# Patient Record
Sex: FEMALE | Race: WHITE
[De-identification: names, ages, dates, MRNs, and addresses within clinical notes are randomized per-mention and may not be internally consistent; named-entity substitution may affect disease eponyms.]

---

## 2019-11-18 ENCOUNTER — HOSPITAL ENCOUNTER (EMERGENCY)
Dept: HOSPITAL 59 - ER | Age: 19
Discharge: HOME | End: 2019-11-18
Payer: MEDICAID

## 2019-11-18 DIAGNOSIS — J06.9: Primary | ICD-10-CM

## 2019-11-18 PROCEDURE — 99282 EMERGENCY DEPT VISIT SF MDM: CPT

## 2019-11-18 NOTE — EMERGENCY DEPARTMENT RECORD
History of Present Illness





- General


Chief Complaint: Cough


Stated Complaint: COLD FOR 2 WEEKS


Time Seen by Provider: 19 22:38


Source: Patient


Mode of Arrival: Ambulatory


Limitations: No limitations





- History of Present Illness


Initial Comments: 





20 yo  at approximately 5 weeks gestation for evaluation of nasal congestion

for approximately 10 weeks.  Patient denies fevers, chills, or productive cough 

symptoms.  Patient was seen at [facility] 3 days ago, diagnosed with URI and prescribed

Flonase as directed.  Patient reports left-sided ear pain symptoms as well, but 

reports she is unable to breathe at night through the nose.  


MD Complaint: Nasal congestion


Onset/Timing: 10


-: Days(s)


Severity: Mild


Consistency: Constant


Improves With: Nothing


Worsens With: Nothing


Associated Symptoms: Denies other symptoms


Treatments Prior to Arrival: Other





- Related Data


                                Home Medications











 Medication  Instructions  Recorded  Confirmed  Last Taken


 


No Home Med [NO HOME MEDS]  19 Unknown











                                    Allergies











Allergy/AdvReac Type Severity Reaction Status Date / Time


 


No Known Drug Allergies Allergy   Verified 19 22:39














Travel Screening





- Travel/Exposure Within Last 30 Days


Have you traveled within the last 30 days?: No





- Travel Symptoms


Symptom Screening: None





Review of Systems


Constitutional: Denies: Chills, Fever, Malaise, Night sweats


Eyes: Denies: Eye discharge, Eye pain


ENT: Reports: Congestion, Ear pain.  Denies: Dental pain, Epistaxis


Respiratory: Reports: Cough.  Denies: Dyspnea


Cardiovascular: Denies: Chest pain, Dyspnea on exertion


Endocrine: Denies: Fatigue, Heat or cold intolerance


Gastrointestinal: Denies: Abdominal pain, Nausea, Vomiting


Genitourinary: Denies: Incontinence, Retention


Musculoskeletal: Denies: Arthralgia, Back pain


Skin: Denies: Bruising, Change in color


Neurological: Denies: Abnormal gait, Confusion, Headache, Seizure


Psychiatric: Denies: Anxiety


Hematological/Lymphatic: Denies: Anemia, Blood Clots





Past Medical History





- SOCIAL HISTORY


Smoking Status: Never smoker


Alcohol Use: None


Drug Use: None





- RESPIRATORY


Hx Respiratory Disorders: No





- CARDIOVASCULAR


Hx Cardio Disorders: No





- NEURO


Hx Neuro Disorders: No





- GI


Hx GI Disorders: No





- 


Hx Genitourinary Disorders: No





- ENDOCRINE


Hx Endocrine Disorders: No





- MUSCULOSKELETAL


Hx Musculoskeletal Disorders: No





- PSYCH


Hx Psych Problems: No





- HEMATOLOGY/ONCOLOGY


Hx Hematology/Oncology Disorders: No





Family Medical History


Any Significant Family History?: No


Family Hx Comment (NOT TO BE USED IN PLACE OF ITEMS BELOW): denies





Physical Exam





- General


General Appearance: Alert, Oriented x3, Cooperative, Mild distress


Limitations: No limitations





- Head


Head exam: Atraumatic, Normocephalic, Normal inspection


Head exam detail: negative: Abrasion, Contusion, Fink's sign, General 

tenderness, Hematoma, Laceration





- Eye


Eye exam: Normal appearance.  negative: Conjunctival injection, Periorbital 

swelling, Periorbital tenderness, Scleral icterus





- ENT


ENT exam: Normal orophraynx, TM's normal bilaterally


Ear exam: negative: Auricular hematoma, Auricular trauma


Nasal Exam: Other (Mild swelling of the turbinates bilaterally).  negative: 

Active bleeding, Discharge, Dried blood, Foreign body


Mouth exam: negative: Drooling, Laceration, Muffled voice, Tongue elevation


Throat exam: negative: Tonsillar erythema, Tonsillomegaly, R peritonsillar mass,

L peritonsillar mass





- Neck


Neck exam: Normal inspection.  negative: Meningismus, Tenderness





- Respiratory


Respiratory exam: Normal lung sounds bilaterally.  negative: Rales, Respiratory 

distress, Rhonchi





- Cardiovascular


Cardiovascular Exam: Regular rate, Normal rhythm, Normal heart sounds





- GI/Abdominal


GI/Abdominal exam: Soft.  negative: Rebound, Rigid, Tenderness





- Rectal


Rectal exam: Deferred





- 


 exam: Deferred





- Extremities


Extremities exam: Normal inspection.  negative: Pedal edema, Tenderness





- Back


Back exam: Denies: CVA tenderness (R), CVA tenderness (L)





- Neurological


Neurological exam: Alert, Normal gait, Oriented X3





- Psychiatric


Psychiatric exam: Normal affect, Normal mood





- Skin


Skin exam: Normal color.  negative: Abrasion


Type of lesion: negative: abrasion





Course





                                   Vital Signs











  19





  22:32


 


Temperature 97.6 F


 


Pulse Rate [ 81





Left] 


 


Respiratory 16





Rate 


 


Blood Pressure 109/73





[Left] 


 


Pulse Ox 100














- Reevaluation(s)


Reevaluation #1: 





19 22:47


Patient was seen and examined


No evidence for bacterial source of infection on examination.


Patient's main complaint is difficulty breathing through the nose at night.


Recommended continued use of Flonase as directed.


Patient appears stable for discharge at this time.





Disposition


Disposition: Discharge


Clinical Impression: 


 Upper respiratory infection, viral





Disposition: Home, Self-Care


Condition: (2) Stable


Instructions:  Allergic Rhinitis (ED)


Additional Instructions: 


Return to ED if your symptoms worsen or if you have any concerns.


Continue Flonase as directed.


Follow-up with your family doctor in 3-5 days as directed.





Forms:  Patient Portal Access


Time of Disposition: 22:43





Quality





- Quality Measures


Quality Measures: N/A





- Blood Pressure Screening


Does Patient Have Any of the Following: No


Blood Pressure Classification: Normal BP Reading


Systolic Measurement: 109


Diastolic Measurement: 73


Screening for High Blood Pressure: < Normal BP, F/U Not Required > []

## 2019-12-16 ENCOUNTER — HOSPITAL ENCOUNTER (EMERGENCY)
Dept: HOSPITAL 59 - ER | Age: 19
Discharge: HOME | End: 2019-12-16
Payer: MEDICAID

## 2019-12-16 DIAGNOSIS — O21.0: Primary | ICD-10-CM

## 2019-12-16 DIAGNOSIS — Z3A.10: ICD-10-CM

## 2019-12-16 PROCEDURE — 99283 EMERGENCY DEPT VISIT LOW MDM: CPT

## 2019-12-16 NOTE — EMERGENCY DEPARTMENT RECORD
History of Present Illness





- General


Chief complaint: Vomiting


Stated complaint: NAUSEA,PREGNENT


Time Seen by Provider: 19 19:58


Source: Patient


Mode of Arrival: Ambulatory


Limitations: No limitations





- History of Present Illness


Initial comments: 





18 yo female presents to ED for evaluation of nausea, vomiting x 1 this evening.

 Patient reports that she is approximately 10 weeks gestation, denies fevers, 

chills, or abdominal pain pain symptoms.  Patient denies spotting or vaginal 

discharge.  Patient denies health problems at her baseline.


MD complaint: Nausea, Vomiting


Onset/Timin


-: Days(s)


Associated Abdominal Pain: No


Radiation: None


Improves with: None


Worsens with: None


Context: Other


Associated Symptoms: Denies other symptoms





- Related Data


                                  Previous Rx's











 Medication  Instructions  Recorded


 


Ondansetron [Zofran Odt] 4 mg PO Q8H PRN #20 tab.rapdis 19











                                    Allergies











Allergy/AdvReac Type Severity Reaction Status Date / Time


 


No Known Drug Allergies Allergy   Verified 19 22:39














Travel Screening





- Travel/Exposure Within Last 30 Days


Have you traveled within the last 30 days?: No





- Travel Symptoms


Symptom Screening: Vomiting





Review of Systems


Constitutional: Denies: Chills, Fever, Malaise, Night sweats


Eyes: Denies: Eye discharge, Eye pain


ENT: Denies: Congestion, Ear pain, Epistaxis


Respiratory: Denies: Cough, Dyspnea


Cardiovascular: Denies: Chest pain, Dyspnea on exertion, Palpitations


Endocrine: Denies: Fatigue, Heat or cold intolerance


Gastrointestinal: Reports: Nausea, Vomiting.  Denies: Abdominal pain


Genitourinary: Denies: Incontinence, Retention


Musculoskeletal: Denies: Arthralgia, Back pain


Skin: Denies: Bruising, Change in color


Neurological: Denies: Abnormal gait, Confusion, Headache, Seizure


Psychiatric: Denies: Anxiety


Hematological/Lymphatic: Denies: Anemia, Blood Clots





Past Medical History





- SOCIAL HISTORY


Smoking Status: Never smoker


Alcohol Use: None


Drug Use: None





- RESPIRATORY


Hx Respiratory Disorders: No





- CARDIOVASCULAR


Hx Cardio Disorders: No





- NEURO


Hx Neuro Disorders: No





- GI


Hx GI Disorders: No





- 


Hx Genitourinary Disorders: No





- ENDOCRINE


Hx Endocrine Disorders: No





- MUSCULOSKELETAL


Hx Musculoskeletal Disorders: No





- PSYCH


Hx Psych Problems: No





- HEMATOLOGY/ONCOLOGY


Hx Hematology/Oncology Disorders: No





Family Medical History


Any Significant Family History?: No


Family Hx Comment (NOT TO BE USED IN PLACE OF ITEMS BELOW): denies





Physical Exam





- General


General Appearance: Alert, Oriented x3, Cooperative, No acute distress


Limitations: No limitations





- Head


Head exam: Atraumatic, Normocephalic, Normal inspection


Head exam detail: negative: Abrasion, Contusion, Fink's sign, General 

tenderness, Hematoma, Laceration





- Eye


Eye exam: Normal appearance.  negative: Conjunctival injection, Periorbital 

swelling, Periorbital tenderness, Scleral icterus





- ENT


Ear exam: negative: Auricular hematoma, Auricular trauma


Nasal Exam: negative: Active bleeding, Discharge, Dried blood, Foreign body


Mouth exam: negative: Drooling, Laceration, Muffled voice, Tongue elevation





- Neck


Neck exam: Normal inspection.  negative: Meningismus, Tenderness





- Respiratory


Respiratory exam: Normal lung sounds bilaterally.  negative: Rales, Respiratory 

distress, Rhonchi, Stridor





- Cardiovascular


Cardiovascular Exam: Regular rate, Normal rhythm, Normal heart sounds





- GI/Abdominal


GI/Abdominal exam: Soft.  negative: Rebound, Rigid, Tenderness





- Rectal


Rectal exam: Deferred





- 


 exam: Deferred





- Extremities


Extremities exam: Normal inspection.  negative: Pedal edema, Tenderness





- Back


Back exam: Denies: CVA tenderness (R), CVA tenderness (L)





- Neurological


Neurological exam: Alert, Normal gait, Oriented X3





- Psychiatric


Psychiatric exam: Normal affect, Normal mood





- Skin


Skin exam: Normal color.  negative: Abrasion


Type of lesion: negative: abrasion





Course





                                   Vital Signs











  19





  20:00


 


Temperature 97.6 F


 


Pulse Rate [ 83





Pulse Ox Probe] 


 


Respiratory 18





Rate 


 


Blood Pressure 109/76





[Left Arm] 


 


Pulse Ox 98














- Reevaluation(s)


Reevaluation #1: 





19 20:14


Patient's symptoms appear c/w nausea/vomiting in pregnancy


Patient has benign abdominal examination


No clinical concern for ectopic/spontaneous miscarriage on examination.


Will treat symptomatically with Zofran as needed.





Disposition


Disposition: Discharge


Clinical Impression: 


 Nausea/vomiting in pregnancy





Disposition: Home, Self-Care


Condition: (2) Stable


Instructions:  Acute Nausea and Vomiting (ED)


Additional Instructions: 


Return to ED if your symptoms worsen or if you have any concerns.


Zofran as directed.


Follow-up with your family doctor in 3-5 days as directed.


Prescriptions: 


Ondansetron [Zofran Odt] 4 mg PO Q8H PRN #20 tab.rapdis


 PRN Reason: Nausea/Vomiting


Forms:  Patient Portal Access


Time of Disposition: 20:07





Quality





- Quality Measures


Quality Measures: N/A





- Blood Pressure Screening


Does Patient Have Any of the Following: No


Blood Pressure Classification: Normal BP Reading


Systolic Measurement: 109


Diastolic Measurement: 76


Screening for High Blood Pressure: < Normal BP, F/U Not Required > []

## 2020-01-01 ENCOUNTER — HOSPITAL ENCOUNTER (EMERGENCY)
Dept: HOSPITAL 59 - ER | Age: 20
Discharge: HOME | End: 2020-01-01
Payer: MEDICAID

## 2020-01-01 DIAGNOSIS — R51: Primary | ICD-10-CM

## 2020-01-01 DIAGNOSIS — R11.2: ICD-10-CM

## 2020-01-01 DIAGNOSIS — M54.2: ICD-10-CM

## 2020-01-01 DIAGNOSIS — H53.149: ICD-10-CM

## 2020-01-01 PROCEDURE — 99284 EMERGENCY DEPT VISIT MOD MDM: CPT

## 2020-01-01 PROCEDURE — 96375 TX/PRO/DX INJ NEW DRUG ADDON: CPT

## 2020-01-01 PROCEDURE — 96374 THER/PROPH/DIAG INJ IV PUSH: CPT

## 2020-01-01 NOTE — EMERGENCY DEPARTMENT RECORD
History of Present Illness





- General


Stated Complaint: MIGRAINE


Time Seen by Provider: 20 19:56


Source: Patient


Mode of Arrival: Ambulatory


Limitations: No limitations





- History of Present Illness


Initial Comments: 





20 yo female presents to ED for evaluation of a "migraine headache" that started

as a typical headache yesterday, worsened today.  Patient denies fevers, chills,

or neck stiffness symptoms, patient does report taking Ibuprofen and zofran for 

her symptoms this morning when her headache symptoms worsened.  Patient denies 

health problems at her baseline.


MD Complaint: Headache


Onset/Timin


-: Days(s)


Onset Description: Gradual


Location: Diffuse


Severity: Moderate


Quality: Throbbing


Consistency: Constant


Improves With: Nothing


Worsens With: None


Treatments Prior to Arrival: Antiemetic, Ibuprofen





- Related Data


                                  Previous Rx's











 Medication  Instructions  Recorded


 


Ondansetron [Zofran Odt] 4 mg PO Q8H PRN #20 tab.rapdis 19











                                    Allergies











Allergy/AdvReac Type Severity Reaction Status Date / Time


 


No Known Drug Allergies Allergy   Verified 20 20:11














Review of Systems


Constitutional: Denies: Chills, Fever, Malaise, Night sweats


Eyes: Denies: Eye discharge, Eye pain


ENT: Denies: Congestion, Ear pain, Epistaxis


Respiratory: Denies: Cough, Dyspnea


Cardiovascular: Denies: Chest pain, Dyspnea on exertion


Endocrine: Denies: Fatigue, Heat or cold intolerance


Gastrointestinal: Denies: Abdominal pain, Nausea, Vomiting


Genitourinary: Denies: Incontinence, Retention


Musculoskeletal: Denies: Arthralgia, Back pain


Skin: Denies: Bruising, Change in color


Neurological: Reports: Headache.  Denies: Abnormal gait, Confusion, Seizure


Psychiatric: Denies: Anxiety


Hematological/Lymphatic: Denies: Anemia, Blood Clots





Past Medical History





- SOCIAL HISTORY


Smoking Status: Never smoker


Drug Use: None





- RESPIRATORY


Hx Respiratory Disorders: No





- CARDIOVASCULAR


Hx Cardio Disorders: No





- NEURO


Hx Neuro Disorders: No





- GI


Hx GI Disorders: No





- 


Hx Genitourinary Disorders: No





- ENDOCRINE


Hx Endocrine Disorders: No





- MUSCULOSKELETAL


Hx Musculoskeletal Disorders: No





- PSYCH


Hx Psych Problems: No





- HEMATOLOGY/ONCOLOGY


Hx Hematology/Oncology Disorders: No





Family Medical History


Family Hx Comment (NOT TO BE USED IN PLACE OF ITEMS BELOW): denies





Physical Exam





- General


General Appearance: Alert, Oriented x3, Cooperative, No acute distress, Other 

(Smiling, well appearing on examination)


Limitations: No limitations





- Head


Head exam: Atraumatic, Normocephalic, Normal inspection


Head exam detail: negative: Abrasion, Contusion, Fink's sign, General 

tenderness, Hematoma, Laceration





- Eye


Eye exam: Normal appearance.  negative: Conjunctival injection, Periorbital 

swelling, Periorbital tenderness, Scleral icterus





- ENT


Ear exam: negative: Auricular hematoma, Auricular trauma


Nasal Exam: negative: Active bleeding, Discharge, Dried blood, Foreign body


Mouth exam: negative: Drooling, Laceration, Muffled voice, Tongue elevation





- Neck


Neck exam: Normal inspection.  negative: Meningismus, Tenderness





- Respiratory


Respiratory exam: Normal lung sounds bilaterally.  negative: Rales, Respiratory 

distress, Rhonchi, Stridor





- Cardiovascular


Cardiovascular Exam: Regular rate, Normal rhythm, Normal heart sounds





- GI/Abdominal


GI/Abdominal exam: Soft, Other (Gravid uterus).  negative: Rebound, Rigid, 

Tenderness





- Rectal


Rectal exam: Deferred





- 


 exam: Deferred





- Extremities


Extremities exam: Normal inspection.  negative: Pedal edema, Tenderness





- Back


Back exam: Denies: CVA tenderness (R), CVA tenderness (L)





- Neurological


Neurological exam: Alert, Normal gait, Oriented X3





- Psychiatric


Psychiatric exam: Normal affect, Normal mood





- Skin


Skin exam: Normal color.  negative: Abrasion


Type of lesion: negative: abrasion





Course





                                   Vital Signs











  20





  20:00


 


Temperature 98.3 F


 


Pulse Rate [ 82





Pulse Ox Probe] 


 


Respiratory 20





Rate 


 


Blood Pressure 106/61





[Left Arm] 


 


Pulse Ox 100














- Reevaluation(s)


Reevaluation #1: 





20 21:04


Patient was reassessed, reports that she is feeling much better.


Patient is smiling, well appearing, and stable for discharge at this time.





Disposition


Disposition: Discharge


Clinical Impression: 


Acute headache


Qualifiers:


 Headache type: unspecified Intractability: not intractable Qualified Code(s): 

R51 - Headache





Disposition: Home, Self-Care


Condition: (2) Stable


Instructions:  Acute Headache (ED)


Additional Instructions: 


Return to ED if your symptoms worsen or if you have any concerns.


(medication) as directed.


Follow-up with your family doctor in 3-5 days as directed.


Time of Disposition: 21:05





Quality





- Quality Measures


Quality Measures: N/A





- Blood Pressure Screening


Does Patient Have Any of the Following: No


Blood Pressure Classification: Normal BP Reading


Systolic Measurement: 106


Diastolic Measurement: 61


Screening for High Blood Pressure: < Normal BP, F/U Not Required > []

## 2020-01-07 ENCOUNTER — HOSPITAL ENCOUNTER (EMERGENCY)
Dept: HOSPITAL 59 - ER | Age: 20
Discharge: HOME | End: 2020-01-07
Payer: MEDICAID

## 2020-01-07 DIAGNOSIS — R05: ICD-10-CM

## 2020-01-07 DIAGNOSIS — J06.9: Primary | ICD-10-CM

## 2020-01-07 PROCEDURE — 87880 STREP A ASSAY W/OPTIC: CPT

## 2020-01-07 PROCEDURE — 99282 EMERGENCY DEPT VISIT SF MDM: CPT

## 2020-01-07 NOTE — EMERGENCY DEPARTMENT RECORD
History of Present Illness





- General


Chief Complaint: Cough


Stated Complaint: COUGH SORE THROAT


Time Seen by Provider: 20 19:07


Source: Patient


Mode of Arrival: Ambulatory


Limitations: No limitations





- History of Present Illness


Initial Comments: 





18 yo female presents to ED for evaluation of sore throat and non-productive 

cough symptoms for the past 1 week.  Patient denies fever symptoms, denies 

abdominal pain, nausea, or vomiting symptoms.  Patient denies health problems at

her baseline.


MD Complaint: Cough, Nasal congestion


Onset/Timin


-: Week(s)


Severity: Moderate


Severity scale (1-10): 7


Consistency: Constant


Worsens With: Nothing


Associated Symptoms: Denies other symptoms





- Related Data


                                  Previous Rx's











 Medication  Instructions  Recorded


 


Ondansetron [Zofran Odt] 4 mg PO Q8H PRN #20 tab.rapdis 19











                                    Allergies











Allergy/AdvReac Type Severity Reaction Status Date / Time


 


No Known Drug Allergies Allergy   Verified 20 20:11














Travel Screening





- Travel/Exposure Within Last 30 Days


Have you traveled within the last 30 days?: No





- Travel/Exposure Within Last Year


Have you traveled outside the [place] in the last year?: No





- Additonal Travel Details


Have you been exposed to anyone with a communicable illness?: No





Review of Systems


Constitutional: Denies: Chills, Fever, Malaise, Night sweats


Eyes: Denies: Eye discharge, Eye pain


ENT: Reports: Congestion, Throat pain.  Denies: Ear pain, Epistaxis


Respiratory: Reports: Cough.  Denies: Dyspnea


Cardiovascular: Denies: Chest pain, Dyspnea on exertion


Endocrine: Denies: Fatigue, Heat or cold intolerance


Gastrointestinal: Denies: Abdominal pain, Nausea, Vomiting


Genitourinary: Denies: Incontinence, Retention


Musculoskeletal: Denies: Arthralgia, Back pain


Skin: Denies: Bruising, Change in color


Neurological: Denies: Abnormal gait, Confusion, Headache, Seizure


Psychiatric: Denies: Anxiety


Hematological/Lymphatic: Denies: Anemia, Blood Clots





Past Medical History





- SOCIAL HISTORY


Smoking Status: Never smoker





- RESPIRATORY


Hx Respiratory Disorders: No





- CARDIOVASCULAR


Hx Cardio Disorders: No





- NEURO


Hx Neuro Disorders: No


Hx Headaches: Yes





- GI


Hx GI Disorders: No





- 


Hx Genitourinary Disorders: No





- ENDOCRINE


Hx Endocrine Disorders: No





- MUSCULOSKELETAL


Hx Musculoskeletal Disorders: No





- PSYCH


Hx Psych Problems: No





- HEMATOLOGY/ONCOLOGY


Hx Hematology/Oncology Disorders: No





Family Medical History


Any Significant Family History?: No


Family Hx Comment (NOT TO BE USED IN PLACE OF ITEMS BELOW): denies





Physical Exam





- General


General Appearance: Alert, Oriented x3, Cooperative, No acute distress, Other 

(On mobile phone, well appearing on examination.)


Limitations: No limitations





- Head


Head exam: Atraumatic, Normocephalic, Normal inspection


Head exam detail: negative: Abrasion, Contusion, Fink's sign, General 

tenderness, Hematoma, Laceration





- Eye


Eye exam: Normal appearance.  negative: Conjunctival injection, Periorbital 

swelling, Periorbital tenderness, Scleral icterus





- ENT


Ear exam: negative: Auricular hematoma, Auricular trauma


Nasal Exam: negative: Active bleeding, Discharge, Dried blood, Foreign body


Mouth exam: negative: Drooling, Laceration, Muffled voice, Tongue elevation


Throat exam: negative: Tonsillar erythema, Tonsillomegaly, Tonsillar exudate, R 

peritonsillar mass, L peritonsillar mass





- Neck


Neck exam: Normal inspection.  negative: Meningismus, Tenderness





- Respiratory


Respiratory exam: Normal lung sounds bilaterally.  negative: Rales, Respiratory 

distress, Rhonchi, Stridor





- Cardiovascular


Cardiovascular Exam: Regular rate, Normal rhythm, Normal heart sounds





- GI/Abdominal


GI/Abdominal exam: Soft.  negative: Rebound, Rigid, Tenderness





- Rectal


Rectal exam: Deferred





- 


 exam: Deferred





- Extremities


Extremities exam: Normal inspection.  negative: Pedal edema, Tenderness





- Back


Back exam: Denies: CVA tenderness (R), CVA tenderness (L)





- Neurological


Neurological exam: Alert, Normal gait, Oriented X3





- Psychiatric


Psychiatric exam: Normal affect, Normal mood





- Skin


Skin exam: Normal color.  negative: Abrasion


Type of lesion: negative: abrasion





Course





                                   Vital Signs











  20





  19:15


 


Temperature 98.1 F


 


Pulse Rate 95 H


 


Respiratory 20





Rate 


 


Blood Pressure 103/63


 


Pulse Ox 100














- Reevaluation(s)


Reevaluation #1: 





20 19:45


Rapid strep: Negative





Patient's symptoms appear c/w viral URI


Specific symptomatic treatment with Tylenol was discussed.


Patient appears stable for discharge at this time.





Disposition


Disposition: Discharge


Clinical Impression: 


 Upper respiratory infection, viral





Disposition: Home, Self-Care


Condition: (2) Stable


Instructions:  Upper Respiratory Infection (ED)


Additional Instructions: 


Return to ED if your symptoms worsen or if you have any concerns.


Follow-up with your family doctor in 3-5 days as directed.


Forms:  Patient Portal Access


Time of Disposition: 19:35





Quality





- Quality Measures


Quality Measures: N/A





- Blood Pressure Screening


Does Patient Have Any of the Following: No


Blood Pressure Classification: Normal BP Reading


Systolic Measurement: 103


Diastolic Measurement: 63


Screening for High Blood Pressure: < Normal BP, F/U Not Required > []

## 2020-01-20 ENCOUNTER — HOSPITAL ENCOUNTER (EMERGENCY)
Dept: HOSPITAL 59 - ER | Age: 20
Discharge: HOME | End: 2020-01-20
Payer: SELF-PAY

## 2020-01-20 DIAGNOSIS — Z33.1: ICD-10-CM
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
## 2020-01-20 DIAGNOSIS — M25.552: ICD-10-CM

## 2020-01-20 DIAGNOSIS — M25.551: ICD-10-CM

## 2020-01-20 DIAGNOSIS — S39.012A: Primary | ICD-10-CM

## 2020-01-20 DIAGNOSIS — M54.6: ICD-10-CM

## 2020-01-20 PROCEDURE — 99283 EMERGENCY DEPT VISIT LOW MDM: CPT
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
